# Patient Record
(demographics unavailable — no encounter records)

---

## 2025-02-20 NOTE — DISCUSSION/SUMMARY
[Reviewed Clinical Lab Test(s)] : Results of clinical tests were reviewed. [Reviewed Radiology Report(s)] : Radiology reports were reviewed. [Discuss Tests w/Referring Providers] : Results of labs/radiology studies and the treatment recommendations were discussed with performing/referring physician. [Discuss Alternatives/Risks/Benefits w/Patient] : All alternatives, risks, and benefits were discussed with the patient/family and all questions were answered.  Patient expressed good understanding and appreciates the importance of follow up as recommended. [FreeTextEntry1] : - CT C/A/P - markers drawn - will need medical and cardiac clearance - telehealth f/u visit scheduled for next week to finalize recs and go over details of surgery I had a long discussion with Ms. HAMM  and her  regarding the diagnosis and management of  endometrial cancer, including risk factors. My recommendation is for standard surgical therapy including hysterectomy, BSO and lymph node dissection, with the use of frozen section diagnosis. The use of sentinel lymph node mapping in endometrial cancer was discussed, which in current use is intended to guide but not necessarily limit the extent of dissection. She is a candidate for a minimally-invasive approach, optimally using a robotic platform. I explained the procedure in detail using diagrams, and reviewed briefly the risks of surgery. The limitations of frozen section diagnosis as well as the risk of conversion to laparotomy for various indications was discussed. I explained that the risk of adhesions, resultant complications (bradford urinary tract) and/or laparotomy is elevated due to her previous surgery.  Perioperative and recuperative issues were discussed briefly. Potential recommendations for adjuvant therapy depending on final pathology were briefly discussed, including radiation and chemotherapy.  Preop sheet and postop instruction packet were given.  She asked many questions which were answered to her apparent satisfaction.   She expressed a desire to proceed with surgery as soon as possible, and will be booked for the OR in the near future.   addendum: Markers:  = 15, CEA <0.6 2/14/25 CT C/A/P: CHEST: LUNGS AND LARGE AIRWAYS: Patent central airways. No pulmonary nodules. PLEURA: No pleural effusion. VESSELS: Within normal limits. HEART: Heart size is normal. No pericardial effusion. MEDIASTINUM AND NACHO: No lymphadenopathy. CHEST WALL AND LOWER NECK: Within normal limits. ABDOMEN AND PELVIS: LIVER: Normal liver morphology. Segment 8 cyst. Additional hypodense foci within the liver are too small to characterize. BILE DUCTS: Normal caliber. GALLBLADDER: Within normal limits. SPLEEN: Within normal limits. PANCREAS: Within normal limits. ADRENALS: Within normal limits. KIDNEYS/URETERS: No hydronephrosis. Subcentimeter hypodense focus of the left kidney is too small to characterize. BLADDER: Within normal limits. REPRODUCTIVE ORGANS: Heterogeneous soft tissue thickening of the endometrial complex measuring up to 3.5 cm in caliber, consistent with known endometrial cancer. BOWEL: No bowel obstruction. 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 VESSELS: Atherosclerotic change. Retroaortic left renal vein, anatomic variant. LYMPH NODES: No lymphadenopathy. ABDOMINAL WALL: Within normal limits. BONES: Degenerative changes of the spine. IMPRESSION: *  Markedly thickened endometrial complex, consistent with known endometrial cancer. *  No evidence of metastatic disease within the chest, abdomen or pelvis.  Telehealth scheduled to discuss results and surgery. LDS

## 2025-02-20 NOTE — LETTER BODY
[Dear  ___] : Dear  [unfilled], [I had the pleasure of evaluating your patient, [unfilled] for ___] : I had the pleasure of evaluating your patient, [unfilled] for [unfilled]. [Attached please find my note.] : Attached please find my note. [FreeTextEntry1] : CT

## 2025-02-20 NOTE — HISTORY OF PRESENT ILLNESS
[FreeTextEntry1] : Ms. HAMM is a 64 year old  postmenopausal female referred by Dr. Lafleur for postmenopausal bleeding with a new diagnosis of endometrial cancer.   Of note she had an endometrial ablation in .  She had very light bleeding until .  After  she had no VB until 2024.     2/3/25- Pelvic US-   Uterus- 5.2 x 5.5 x 4.5 cm    Endometrial Lining- 3.6mm   LTO 1.2 x 1.2 cm   RTO- not visualized   25- Endometrial Biopsy- serous carcinoma (outside path)   PMHx- HLD, kidney stones PSHx-  x 3, (pfannenstiel); benign breast lumpectomy x 3 Family hx of cancer- mother with tongue cancer, mother with stomach cancer, paternal cousin with GYN cancer Social hx- here with her  of 41 years, Monster.  He was diagnosed with adenocystic salivary gland carcinoma 5 years ago and has widely metastatic disease. They run an engineering business together.   She had spotting. She denies pelvic pain/pressure. She denies bloating, abdominal distention or change in bowel or urinary habits.  She denies recent weight changes.  She denies nausea/vomiting.  She denies all other associated signs and symptoms.   She is here today with her  to discuss further surgical management.   HM: Pap- 2/3/25- negative Mammo- 25- BIRADS-1 Colonoscopy- none  Referred by (GYN) Dr. Lafleur PCP: Dr. Hoffman Cardiologist: Dr. Galarza Endocrinologist: Dr. Graves Urologist: Dr. Nielsen

## 2025-02-20 NOTE — PHYSICAL EXAM
[Chaperone Present] : A chaperone was present in the examining room during all aspects of the physical examination [03469] : A chaperone was present during the pelvic exam. [Normal] : Anus and perineum: Normal sphincter tone, no masses, no prolapse. [FreeTextEntry2] : Jonna [de-identified] : pfalottietiel scar [de-identified] : mobile, retroverted [de-identified] : adnexa nonpalpable

## 2025-02-20 NOTE — HISTORY OF PRESENT ILLNESS
[Home] : at home, [unfilled] , at the time of the visit. [Medical Office: ([facility])___] : at the medical office located in  [Telehealth (audio & video)] : This visit was provided via telehealth using real-time 2-way audio visual technology. [Verbal consent obtained from patient] : the patient, [unfilled]

## 2025-02-20 NOTE — PHYSICAL EXAM
[Chaperone Present] : A chaperone was present in the examining room during all aspects of the physical examination [36275] : A chaperone was present during the pelvic exam. [Normal] : Anus and perineum: Normal sphincter tone, no masses, no prolapse. [FreeTextEntry2] : Jonna [de-identified] : pfalottietiel scar [de-identified] : mobile, retroverted [de-identified] : adnexa nonpalpable

## 2025-02-20 NOTE — ASSESSMENT
[FreeTextEntry1] : 65y/o with newly-diagnosed endometrial cancer, serous carcinoma on outside path.

## 2025-02-20 NOTE — HISTORY OF PRESENT ILLNESS
[Home] : at home, [unfilled] , at the time of the visit. [Medical Office: ([facility])___] : at the medical office located in  [Telehealth (audio & video)] : This visit was provided via telehealth using real-time 2-way audio visual technology. [Verbal consent obtained from patient] : the patient, [unfilled]

## 2025-03-20 NOTE — REASON FOR VISIT
[Post Op] : post op visit [de-identified] : 3/7/25 [de-identified] : Robotic-assisted total laparoscopic hysterectomy, bilateral salpingo-oophorectomy, bilateral pelvic and paraaortic sentinel lymph node mapping and dissection, partial omentectomy,  peritoneal biopsies, and diagnostic cystoscopy. [de-identified] : She reports that she has had a very smooth recovery with no issues. She denies abnl vaginal bleeding, pelvic or abdominal pain or urinary or bowel complaints. No longer requiring pain medication. Returning to usual activities and maintaining pelvic rest.

## 2025-03-20 NOTE — DISCUSSION/SUMMARY
[Clean] : was clean [Dry] : was dry [Intact] : was intact [Erythema] : was not erythematous [Ecchymosis] : was not ecchymotic [Seroma] : had no seroma [None] : had no drainage [Normal Skin] : normal appearance [Firm] : soft [Tender] : nontender [Rebound] : no rebound tenderness [Guarding] : no guarding [Incisional Hernia] : no incisional hernia [Mass] : no palpable mass [External Genitalia Abnormal] : normal external genitalia [Vaginal Exam Abnormal] : normal vaginal exam [Doing Well] : is doing well [Excellent Pain Control] : has excellent pain control [No Sign of Infection] : is showing no signs of infection [0] : 0 [de-identified] : cuff intact; hymenal ring with healing laceration [de-identified] : normal [de-identified] : regular diet. continue to maintain pelvic rest x 8 more weeks. refrain from heavy lifting and strenuous exercise x 4 more weeks. [FreeTextEntry1] : PATHOLOGY: Peritoneal cytology: negative for malignant cells Final pathology penidng at time of visit.

## 2025-03-20 NOTE — ASSESSMENT
[FreeTextEntry1] : 65y/o s/p RATLH/BSO/staging/extensive AMI/cystoscopy for high-grade endometrial cancer, healing well.   PLAN: Final pathology results (with molecular results) are pending; I will call her after tumor board discussion with treatment recommendation.  Instructions were reviewed.  The risk and signs and symptoms of recurrence and surveillance plan were reviewed in detail.  She was advised to see me in 3-4 months.  All questions were answered to her apparent satisfaction.

## 2025-04-17 NOTE — PHYSICAL EXAM
[Normal] : well developed, well nourished, in no acute distress [] : no respiratory distress [Respiration, Rhythm And Depth] : normal respiratory rhythm and effort [Edema] : no peripheral edema present [Abdomen Soft] : soft [No Focal Deficits] : no focal deficits [Oriented To Time, Place, And Person] : oriented to person, place, and time [Affect] : the affect was normal [Normal External Genitalia] : normal external genitalia  [de-identified] : vaginal canal should admit 2.5 cm cylinder

## 2025-04-17 NOTE — HISTORY OF PRESENT ILLNESS
[FreeTextEntry1] : Ms. HAMM is a 64 year old  postmenopausal female with newly diagnosed endometrial cancer, presenting today for discussion of radiation therapy.   Her HPI as I understand it is summarized below:   She presented with postmenopausal bleeding.  Of note, she had an endometrial ablation in . She had very light periods until . After menopause in  she had no VB until 2024.  25 Endometrial Biopsy: serous carcinoma (outside path)  25 CT C/A/P: Markedly thickened endometrial complex, consistent with known endometrial cancer. No evidence of metastatic disease within the chest, abdomen or pelvis.  3/7/25: s/p robotic-assisted total laparoscopic hysterectomy, bilateral salpingo-oophorectomy, bilateral pelvic and paraaortic sentinel lymph node mapping and dissection, partial omentectomy, peritoneal biopsies, and diagnostic cystoscopy with Dr. Tucker.  Pathology: High grade endometrial carcinoma. No myometrial invasion, no LVI, regional lymph nodes negative.  POLE negative.    Uterus, cervix, bilateral fallopian tubes and ovaries - High grade endometrial carcinoma, see note - Myometrial invasion is not identified - Lymphovascular invasion is not identified - Myometrium with leiomyomata - Benign bilateral ovaries and fallopian tubes - Benign cervix  Note:  The tumor appears morphologically high grade with abundant tumor heterogeneity including squamous differentiation, confirming an endometrioid component.  Immunohistochemical stains show the tumor cells to be mostly aberrant (overexpressed) by p53, however, there are foci with equivocal staining, similar to the prior biopsy.  The DNA MMR proteins MLH1, PMS2, MSH2, and MSH6 all show retained expression. The differential diagonsis includes a copy number high/serous-like carcinoma, however, a POLE mutated tumor cannot be excluded.  POLE tesing is underway and the results, along with a more definitive diagonsis will be reported in an addendum.   25: She presents today for discussion of radiation therapy. She is feeling well, recovered well from surgery, although still with some fatigue. She has no abdominal or pelvic pain, no vaginal bleeding, no GI/ complaints.  We were able to obtain the POLE mutation status during the consultation. No mutations were identified.

## 2025-06-19 NOTE — REASON FOR VISIT
[Post-Treatment Evaluation] : post-treatment evaluation for [Endometrial Cancer] : endometrial cancer [Spouse] : spouse

## 2025-06-21 NOTE — PHYSICAL EXAM
[MA] : MA [Absent] : Adnexa(ae): Absent [Normal] : Recto-Vaginal Exam: Normal [FreeTextEntry2] : Jonna [de-identified] : well-healed  [de-identified] : well-healed cuff [de-identified] : cuff mobile, no mass

## 2025-06-21 NOTE — HISTORY OF PRESENT ILLNESS
[FreeTextEntry1] : Ms. HAMM is a 64 year old  postmenopausal female referred by Dr. Lafleur for postmenopausal bleeding with a new diagnosis of endometrial cancer.   Of note she had an endometrial ablation in .  She had very light bleeding until .  After  she had no VB until 2024.     2/3/25- Pelvic US-   Uterus- 5.2 x 5.5 x 4.5 cm    Endometrial Lining- 3.6mm   LTO 1.2 x 1.2 cm   RTO- not visualized   25- Endometrial Biopsy- serous carcinoma (outside path)  25 Initial GYN ONC consult Markers:  = 15, CEA <0.6 25 CT C/A/P: CHEST: LUNGS AND LARGE AIRWAYS: Patent central airways. No pulmonary nodules. PLEURA: No pleural effusion. VESSELS: Within normal limits. HEART: Heart size is normal. No pericardial effusion. MEDIASTINUM AND NACHO: No lymphadenopathy. CHEST WALL AND LOWER NECK: Within normal limits. ABDOMEN AND PELVIS: LIVER: Normal liver morphology. Segment 8 cyst. Additional hypodense foci within the liver are too small to characterize. BILE DUCTS: Normal caliber. GALLBLADDER: Within normal limits. SPLEEN: Within normal limits. PANCREAS: Within normal limits. ADRENALS: Within normal limits. KIDNEYS/URETERS: No hydronephrosis. Subcentimeter hypodense focus of the left kidney is too small to characterize. BLADDER: Within normal limits. REPRODUCTIVE ORGANS: Heterogeneous soft tissue thickening of the endometrial complex measuring up to 3.5 cm in caliber, consistent with known endometrial cancer. BOWEL: No bowel obstruction. 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 VESSELS: Atherosclerotic change. Retroaortic left renal vein, anatomic variant. LYMPH NODES: No lymphadenopathy. ABDOMINAL WALL: Within normal limits. BONES: Degenerative changes of the spine. IMPRESSION: * Markedly thickened endometrial complex, consistent with known endometrial cancer. * No evidence of metastatic disease within the chest, abdomen or pelvis.  3/7/25 Robotic-assisted total laparoscopic hysterectomy, bilateral salpingo-oophorectomy, bilateral pelvic and paraaortic sentinel lymph node mapping and dissection, partial omentectomy, peritoneal biopsies, and diagnostic cystoscopy.  Pathology:  POLE(-) Final Diagnosis 1  Left pelvic sentinel lymph nodes - Two benign lymph nodes (0/2), see note Note:  Deeper level sections and immunohistochemical stains for cytokeratins (AE1:AE3) show no evidence of metastatic tumor. 2  Portion of omentum - Benign fibroadipose tissue 3  Right pelvic sentinel lymph nodes - One benign lymph node (0/1), see note Note:  Deeper level sections and immunohistochemical stains for cytokeratins (AE1:AE3) show no evidence of metastatic tumor. 4  [place] para aortic node - One benign lymph node (0/1), see note Note:  Deeper level sections and immunohistochemical stains for cytokeratins (AE1:AE3) show no evidence of metastatic tumor. 5  Uterus, cervix, bilateral fallopian tubes and ovaries - High grade endometrial carcinoma, see note - Myometrial invasion is not identified - Lymphovascular invasion is not identified - Myometrium with leiomyomata - Benign bilateral ovaries and fallopian tubes - Benign cervix Note:  The tumor appears morphologically high grade with abundant tumor heterogeneity including squamous differentiation, confirming an endometrioid component.  Immunohistochemical stains show the tumor cells to be mostly aberrant (overexpressed) by p53, however, there are foci with equivocal staining, similar to the prior biopsy.  The DNA MMR proteins MLH1, PMS2, MSH2, and MSH6 all show retained expression. 6  Cul de sac peritoneum biopsy - Benign fibroadipose tissue  She received VBT with Dr. Reid. Tolerated well.   PMHx- HLD, kidney stones PSHx-  x 3, (pfannenstiel); benign breast lumpectomy x 3 Family hx of cancer- mother with tongue cancer, mother with stomach cancer, paternal cousin with GYN cancer Social hx- here with her  of 41 years, Monster.  He was diagnosed with adenocystic salivary gland carcinoma 5 years ago and has widely metastatic disease. They run an engineering business together.   She denies VB and all other associated signs and symptoms.    HM: Pap- 2/3/25- negative Mammo- 25- BIRADS-1 Colonoscopy- none; aware of recs  Referred by (GYN) Dr. Lafleur PCP: Dr. Hoffman Cardiologist: Dr. Galarza Endocrinologist: Dr. Graves Urologist: Dr. Nielsen

## 2025-06-21 NOTE — ASSESSMENT
[FreeTextEntry1] : 63y/o with stage IA serous EMC, s/p surgery and VBT, clinically without evidence of disease.

## 2025-06-21 NOTE — DISCUSSION/SUMMARY
[Reviewed Clinical Lab Test(s)] : Results of clinical tests were reviewed. [Reviewed Radiology Report(s)] : Radiology reports were reviewed. [Discuss Tests w/Referring Providers] : Results of labs/radiology studies and the treatment recommendations were discussed with performing/referring physician. [Discuss Alternatives/Risks/Benefits w/Patient] : All alternatives, risks, and benefits were discussed with the patient/family and all questions were answered.  Patient expressed good understanding and appreciates the importance of follow up as recommended. [FreeTextEntry1] : - markers (addendum:  = 16, CEA <0.6; Patient notified via MOA tasklist. LDS) - plan for CT A/P 6m postop (Sept) prior to next appt - ordered - exam findings reviewed - The risk of recurrence, signs and symptoms and surveillance plan were reviewed in detail.  - HM reviewed. - She was advised to see me in 3-4 months. We will alternate with Dr Reid thereafter. - All questions were answered to her apparent satisfaction.

## 2025-06-21 NOTE — HISTORY OF PRESENT ILLNESS
[FreeTextEntry1] : Ms. HAMM is a 64 year old  postmenopausal female referred by Dr. Lafleur for postmenopausal bleeding with a new diagnosis of endometrial cancer.   Of note she had an endometrial ablation in .  She had very light bleeding until .  After  she had no VB until 2024.     2/3/25- Pelvic US-   Uterus- 5.2 x 5.5 x 4.5 cm    Endometrial Lining- 3.6mm   LTO 1.2 x 1.2 cm   RTO- not visualized   25- Endometrial Biopsy- serous carcinoma (outside path)  25 Initial GYN ONC consult Markers:  = 15, CEA <0.6 25 CT C/A/P: CHEST: LUNGS AND LARGE AIRWAYS: Patent central airways. No pulmonary nodules. PLEURA: No pleural effusion. VESSELS: Within normal limits. HEART: Heart size is normal. No pericardial effusion. MEDIASTINUM AND NACHO: No lymphadenopathy. CHEST WALL AND LOWER NECK: Within normal limits. ABDOMEN AND PELVIS: LIVER: Normal liver morphology. Segment 8 cyst. Additional hypodense foci within the liver are too small to characterize. BILE DUCTS: Normal caliber. GALLBLADDER: Within normal limits. SPLEEN: Within normal limits. PANCREAS: Within normal limits. ADRENALS: Within normal limits. KIDNEYS/URETERS: No hydronephrosis. Subcentimeter hypodense focus of the left kidney is too small to characterize. BLADDER: Within normal limits. REPRODUCTIVE ORGANS: Heterogeneous soft tissue thickening of the endometrial complex measuring up to 3.5 cm in caliber, consistent with known endometrial cancer. BOWEL: No bowel obstruction. 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 VESSELS: Atherosclerotic change. Retroaortic left renal vein, anatomic variant. LYMPH NODES: No lymphadenopathy. ABDOMINAL WALL: Within normal limits. BONES: Degenerative changes of the spine. IMPRESSION: * Markedly thickened endometrial complex, consistent with known endometrial cancer. * No evidence of metastatic disease within the chest, abdomen or pelvis.  3/7/25 Robotic-assisted total laparoscopic hysterectomy, bilateral salpingo-oophorectomy, bilateral pelvic and paraaortic sentinel lymph node mapping and dissection, partial omentectomy, peritoneal biopsies, and diagnostic cystoscopy.  Pathology:  POLE(-) Final Diagnosis 1  Left pelvic sentinel lymph nodes - Two benign lymph nodes (0/2), see note Note:  Deeper level sections and immunohistochemical stains for cytokeratins (AE1:AE3) show no evidence of metastatic tumor. 2  Portion of omentum - Benign fibroadipose tissue 3  Right pelvic sentinel lymph nodes - One benign lymph node (0/1), see note Note:  Deeper level sections and immunohistochemical stains for cytokeratins (AE1:AE3) show no evidence of metastatic tumor. 4  [place] para aortic node - One benign lymph node (0/1), see note Note:  Deeper level sections and immunohistochemical stains for cytokeratins (AE1:AE3) show no evidence of metastatic tumor. 5  Uterus, cervix, bilateral fallopian tubes and ovaries - High grade endometrial carcinoma, see note - Myometrial invasion is not identified - Lymphovascular invasion is not identified - Myometrium with leiomyomata - Benign bilateral ovaries and fallopian tubes - Benign cervix Note:  The tumor appears morphologically high grade with abundant tumor heterogeneity including squamous differentiation, confirming an endometrioid component.  Immunohistochemical stains show the tumor cells to be mostly aberrant (overexpressed) by p53, however, there are foci with equivocal staining, similar to the prior biopsy.  The DNA MMR proteins MLH1, PMS2, MSH2, and MSH6 all show retained expression. 6  Cul de sac peritoneum biopsy - Benign fibroadipose tissue  She received VBT with Dr. Reid. Tolerated well.   PMHx- HLD, kidney stones PSHx-  x 3, (pfannenstiel); benign breast lumpectomy x 3 Family hx of cancer- mother with tongue cancer, mother with stomach cancer, paternal cousin with GYN cancer Social hx- here with her  of 41 years, Monster.  He was diagnosed with adenocystic salivary gland carcinoma 5 years ago and has widely metastatic disease. They run an engineering business together.   She denies VB and all other associated signs and symptoms.    HM: Pap- 2/3/25- negative Mammo- 25- BIRADS-1 Colonoscopy- none; aware of recs  Referred by (GYN) Dr. Lafleur PCP: Dr. Hoffman Cardiologist: Dr. Galarza Endocrinologist: Dr. Graves Urologist: Dr. Nielsen

## 2025-06-21 NOTE — LETTER BODY
[Dear  ___] : Dear  [unfilled], [I recently saw our patient [unfilled] for a follow-up visit.] : I recently saw our patient, [unfilled] for a follow-up visit. [Attached please find my note.] : Attached please find my note. [FreeTextEntry1] : markers

## 2025-06-21 NOTE — DISCUSSION/SUMMARY
Letter faxed to [facility]   [Reviewed Clinical Lab Test(s)] : Results of clinical tests were reviewed. [Reviewed Radiology Report(s)] : Radiology reports were reviewed. [Discuss Tests w/Referring Providers] : Results of labs/radiology studies and the treatment recommendations were discussed with performing/referring physician. [Discuss Alternatives/Risks/Benefits w/Patient] : All alternatives, risks, and benefits were discussed with the patient/family and all questions were answered.  Patient expressed good understanding and appreciates the importance of follow up as recommended. [FreeTextEntry1] : - markers (addendum:  = 16, CEA <0.6; Patient notified via MOA tasklist. LDS) - plan for CT A/P 6m postop (Sept) prior to next appt - ordered - exam findings reviewed - The risk of recurrence, signs and symptoms and surveillance plan were reviewed in detail.  - HM reviewed. - She was advised to see me in 3-4 months. We will alternate with Dr Reid thereafter. - All questions were answered to her apparent satisfaction.

## 2025-06-21 NOTE — PHYSICAL EXAM
[MA] : MA [Absent] : Adnexa(ae): Absent [Normal] : Recto-Vaginal Exam: Normal [FreeTextEntry2] : Jonna [de-identified] : well-healed  [de-identified] : well-healed cuff [de-identified] : cuff mobile, no mass

## 2025-06-24 NOTE — REVIEW OF SYSTEMS
[Constipation: Grade 0] : Constipation: Grade 0 [Diarrhea: Grade 0] : Diarrhea: Grade 0 [Fatigue: Grade 1 - Fatigue relieved by rest] : Fatigue: Grade 1 - Fatigue relieved by rest [Urinary Incontinence: Grade 0] : Urinary Incontinence: Grade 0  [Urinary Retention: Grade 0] : Urinary Retention: Grade 0 [Urinary Tract Pain: Grade 0] : Urinary Tract Pain: Grade 0 [Urinary Urgency: Grade 0] : Urinary Urgency: Grade 0 [Urinary Frequency: Grade 0] : Urinary Frequency: Grade 0

## 2025-06-25 NOTE — HISTORY OF PRESENT ILLNESS
[FreeTextEntry1] : Ms. 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   She presents today for post-treatment evaluation.  Feeling well. Reports fatigue which continues to improve. No urinary or bowel complaints. No vaginal bleeding or discharge. Is not sexually active. Vaginal dilators provided and verbalized understanding. She reports she has had episodes of feeling "off balance". She lost her balance while in [place] and recently in [place], resulting in a fall. Did not have LOC or hit her head.

## 2025-06-25 NOTE — HISTORY OF PRESENT ILLNESS
[FreeTextEntry1] : Ms. 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   She presents today for post-treatment evaluation.  Feeling well. Reports fatigue which continues to improve. No urinary or bowel complaints. No vaginal bleeding or discharge. Is not sexually active. Vaginal dilators provided and verbalized understanding. She reports she has had episodes of feeling "off balance". She lost her balance while in [place] and recently in [place], resulting in a fall. Did not have LOC or hit her head.

## 2025-06-25 NOTE — PHYSICAL EXAM
[General Appearance - In No Acute Distress] : in no acute distress [] : no respiratory distress [Normal] : no focal deficits [Oriented To Time, Place, And Person] : oriented to person, place, and time [Normal External Genitalia] : normal external genitalia  [Normal Vaginal Cuff] : vaginal cuff without lesion or nodularity

## 2025-06-25 NOTE — HISTORY OF PRESENT ILLNESS
[FreeTextEntry1] : Ms. 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   She presents today for post-treatment evaluation.  Feeling well. Reports fatigue which continues to improve. No urinary or bowel complaints. No vaginal bleeding or discharge. Is not sexually active. Vaginal dilators provided and verbalized understanding. She reports she has had episodes of feeling "off balance". She lost her balance while in [place] and recently in [place], resulting in a fall. Did not have LOC or hit her head.